# Patient Record
Sex: FEMALE | Race: WHITE | Employment: UNEMPLOYED | URBAN - METROPOLITAN AREA
[De-identification: names, ages, dates, MRNs, and addresses within clinical notes are randomized per-mention and may not be internally consistent; named-entity substitution may affect disease eponyms.]

---

## 2019-05-03 ENCOUNTER — OFFICE VISIT (OUTPATIENT)
Dept: FAMILY MEDICINE CLINIC | Age: 54
End: 2019-05-03
Payer: COMMERCIAL

## 2019-05-03 VITALS
DIASTOLIC BLOOD PRESSURE: 80 MMHG | WEIGHT: 156 LBS | OXYGEN SATURATION: 98 % | HEART RATE: 85 BPM | BODY MASS INDEX: 31.45 KG/M2 | SYSTOLIC BLOOD PRESSURE: 126 MMHG | TEMPERATURE: 98.6 F | HEIGHT: 59 IN

## 2019-05-03 DIAGNOSIS — Z01.818 PRE-OP EXAM: Primary | ICD-10-CM

## 2019-05-03 DIAGNOSIS — M25.511 CHRONIC RIGHT SHOULDER PAIN: ICD-10-CM

## 2019-05-03 DIAGNOSIS — G89.29 CHRONIC RIGHT SHOULDER PAIN: ICD-10-CM

## 2019-05-03 PROCEDURE — 1036F TOBACCO NON-USER: CPT | Performed by: NURSE PRACTITIONER

## 2019-05-03 PROCEDURE — G8427 DOCREV CUR MEDS BY ELIG CLIN: HCPCS | Performed by: NURSE PRACTITIONER

## 2019-05-03 PROCEDURE — 99214 OFFICE O/P EST MOD 30 MIN: CPT | Performed by: NURSE PRACTITIONER

## 2019-05-03 PROCEDURE — 3017F COLORECTAL CA SCREEN DOC REV: CPT | Performed by: NURSE PRACTITIONER

## 2019-05-03 PROCEDURE — G8417 CALC BMI ABV UP PARAM F/U: HCPCS | Performed by: NURSE PRACTITIONER

## 2019-05-03 ASSESSMENT — ENCOUNTER SYMPTOMS
SHORTNESS OF BREATH: 0
WHEEZING: 0
VOMITING: 0
COUGH: 0
CHOKING: 0
NAUSEA: 0
CONSTIPATION: 0
BLOOD IN STOOL: 0
DIARRHEA: 0

## 2019-05-03 ASSESSMENT — PATIENT HEALTH QUESTIONNAIRE - PHQ9: DEPRESSION UNABLE TO ASSESS: URGENT/EMERGENT SITUATION

## 2019-05-03 NOTE — PROGRESS NOTES
Preoperative Consultation      Mik Barboza  YOB: 1965    Date of Service:  5/3/2019    Vitals:    05/03/19 1118   BP: 126/80   Pulse: 85   Temp: 98.6 °F (37 °C)   SpO2: 98%   Weight: 156 lb (70.8 kg)   Height: 4' 11\" (1.499 m)     Wt Readings from Last 2 Encounters:   05/03/19 156 lb (70.8 kg)   06/29/16 156 lb 15.5 oz (71.2 kg)     BP Readings from Last 3 Encounters:   05/03/19 126/80   06/29/16 (!) 151/106   06/13/16 130/84        No Known Allergies  No outpatient medications have been marked as taking for the 5/3/19 encounter (Office Visit) with ANGELA Mills CNP. Chief Complaint   Patient presents with    Pre-op Exam     total shoulder replacement, 5/13/19, dr Carolin Mora, [facility]     The following comorbid conditions were considered relevant to operative risk, sotheir status/stability was assessed: no chronic condition. HPI:    This patient presents to theoffice today for a preoperative consultation at the request of surgeon, Dr. Ash Ran plans on [facility] right total shoulder [facility]     Planned anesthesia: General  Known anesthesia problems: None   Bleeding risk:No recent or remote history of abnormal bleeding  Personal or FH of DVT/PE: No   Recent steroid use: no  Exercise tolerance: Pt is able to walk 3 city blocks without getting sob. Patient objection to receiving blood products: No     There is no problem list on file for this patient. History reviewed. No pertinent past medical history. History reviewed. No pertinent surgical history.   Family History   Problem Relation Age of Onset    Other Mother         lupus    Rheum Arthritis Mother     Osteoarthritis Mother     Heart Disease Mother         stent placed    Heart Disease Father     Arthritis Sister      Social History     Socioeconomic History    Marital status:      Spouse name: Not on file    Number of children: Not